# Patient Record
Sex: FEMALE | Race: WHITE | NOT HISPANIC OR LATINO
[De-identification: names, ages, dates, MRNs, and addresses within clinical notes are randomized per-mention and may not be internally consistent; named-entity substitution may affect disease eponyms.]

---

## 2019-09-17 ENCOUNTER — APPOINTMENT (OUTPATIENT)
Dept: ULTRASOUND IMAGING | Facility: HOSPITAL | Age: 84
End: 2019-09-17
Payer: MEDICARE

## 2019-09-17 ENCOUNTER — OUTPATIENT (OUTPATIENT)
Dept: OUTPATIENT SERVICES | Facility: HOSPITAL | Age: 84
LOS: 1 days | End: 2019-09-17
Payer: MEDICARE

## 2019-09-17 DIAGNOSIS — Z00.8 ENCOUNTER FOR OTHER GENERAL EXAMINATION: ICD-10-CM

## 2019-09-17 PROCEDURE — 93971 EXTREMITY STUDY: CPT | Mod: 26,LT

## 2019-09-17 PROCEDURE — 93971 EXTREMITY STUDY: CPT

## 2019-09-23 PROBLEM — Z00.00 ENCOUNTER FOR PREVENTIVE HEALTH EXAMINATION: Status: ACTIVE | Noted: 2019-09-23

## 2019-09-24 ENCOUNTER — APPOINTMENT (OUTPATIENT)
Dept: ORTHOPEDIC SURGERY | Facility: CLINIC | Age: 84
End: 2019-09-24
Payer: MEDICARE

## 2019-09-24 VITALS — BODY MASS INDEX: 19.41 KG/M2 | WEIGHT: 90 LBS | HEIGHT: 57 IN

## 2019-09-24 DIAGNOSIS — Z78.9 OTHER SPECIFIED HEALTH STATUS: ICD-10-CM

## 2019-09-24 DIAGNOSIS — M71.22 SYNOVIAL CYST OF POPLITEAL SPACE [BAKER], LEFT KNEE: ICD-10-CM

## 2019-09-24 PROCEDURE — 99202 OFFICE O/P NEW SF 15 MIN: CPT

## 2019-09-24 RX ORDER — SIMVASTATIN 80 MG/1
TABLET, FILM COATED ORAL
Refills: 0 | Status: ACTIVE | COMMUNITY

## 2019-09-24 RX ORDER — METOPROLOL TARTRATE 75 MG/1
TABLET, FILM COATED ORAL
Refills: 0 | Status: ACTIVE | COMMUNITY

## 2019-09-24 NOTE — HISTORY OF PRESENT ILLNESS
[de-identified] : Patient is a 85 year old female who presents for an initial evaluation regarding left knee swelling. She denies injury or direct trauma. She noticed the swelling several weeks ago. It is over the posterior aspect of the left knee. She went to a med station for an initial evaluation and was prescribed Ibuprofen by the attending physician. The medication did not help. She returned and was then referred for a Doppler US. The ultrasound was performed on 09/17/2019 and revealed a 4.7 x 1.7 x 3.8 cm left Baker's cyst. She followed up again at the med station for review at which point she was given Prednisone. She states that she does not like to take medication. She is not having much pain, but describes her feeling as mostly discomfort. The cyst is affecting her gait and she now has a slight limp on the left side. The swelling has traveled down and is now in the lower leg, ankle and foot.

## 2019-09-24 NOTE — END OF VISIT
[FreeTextEntry3] : I, Bill Thomson MD, ordering physician, have read and attest that all the information, medical decision making and discharge instructions within are true and accurate.

## 2019-09-24 NOTE — ADDENDUM
[FreeTextEntry1] : I, Vida Ochoa wrote this note acting as a scribe for Dr. Bill Thomson on Sep 24, 2019.

## 2019-09-24 NOTE — PHYSICAL EXAM
[de-identified] : Patient is WDWN, alert, and in no acute distress. Breathing is unlabored. She is grossly oriented to person, place, and time. \par \par Left knee: Mass present over the popliteal fossa. There is minimal tenderness to palpation over the posterior aspect. There is diffuse swelling throughout the remainder of the lower leg, ankle and foot. No valgus or valgus instability present on provocative testing. Flexion and extension 5/5.\par Range of motion: Active flexion and extension full and painless. No crepitus.\par Tests and Signs: All tests for stability are normal. \par Strength: flexion and extension 5/5  [de-identified] : Doppler US of the left lower extremity on 09/17/2019 at a [facility] revealed a 4.7 x 1.7 x 3.8 cm left Baker's cyst.

## 2019-09-24 NOTE — DISCUSSION/SUMMARY
[de-identified] : The underlying pathophysiology was reviewed with the patient. Treatment options were discussed including; observation, NSAIDS, analgesics, bracing, physical therapy. \par \par The patient was advised to discontinue the Prednisone. \par I also informed the patient the left knee Baker's cyst would eventually resorb. \par Follow up in 6 weeks.

## 2021-12-23 ENCOUNTER — TRANSCRIPTION ENCOUNTER (OUTPATIENT)
Age: 86
End: 2021-12-23

## 2023-01-01 ENCOUNTER — NON-APPOINTMENT (OUTPATIENT)
Age: 88
End: 2023-01-01

## 2023-07-13 ENCOUNTER — EMERGENCY (EMERGENCY)
Facility: HOSPITAL | Age: 88
LOS: 1 days | Discharge: HOME | End: 2023-07-13
Attending: INTERNAL MEDICINE | Admitting: STUDENT IN AN ORGANIZED HEALTH CARE EDUCATION/TRAINING PROGRAM
Payer: MEDICARE

## 2023-07-13 VITALS
TEMPERATURE: 98 F | WEIGHT: 85.1 LBS | RESPIRATION RATE: 20 BRPM | HEART RATE: 107 BPM | HEIGHT: 55 IN | OXYGEN SATURATION: 99 %

## 2023-07-13 VITALS
OXYGEN SATURATION: 98 % | TEMPERATURE: 99 F | RESPIRATION RATE: 20 BRPM | DIASTOLIC BLOOD PRESSURE: 89 MMHG | HEART RATE: 98 BPM | SYSTOLIC BLOOD PRESSURE: 144 MMHG

## 2023-07-13 PROCEDURE — 90471 IMMUNIZATION ADMIN: CPT

## 2023-07-13 PROCEDURE — 70450 CT HEAD/BRAIN W/O DYE: CPT | Mod: MA

## 2023-07-13 PROCEDURE — 70450 CT HEAD/BRAIN W/O DYE: CPT | Mod: 26,MA

## 2023-07-13 PROCEDURE — 90715 TDAP VACCINE 7 YRS/> IM: CPT

## 2023-07-13 PROCEDURE — 99284 EMERGENCY DEPT VISIT MOD MDM: CPT

## 2023-07-13 PROCEDURE — 99284 EMERGENCY DEPT VISIT MOD MDM: CPT | Mod: 25

## 2023-07-13 RX ORDER — TETANUS TOXOID, REDUCED DIPHTHERIA TOXOID AND ACELLULAR PERTUSSIS VACCINE, ADSORBED 5; 2.5; 8; 8; 2.5 [IU]/.5ML; [IU]/.5ML; UG/.5ML; UG/.5ML; UG/.5ML
0.5 SUSPENSION INTRAMUSCULAR ONCE
Refills: 0 | Status: COMPLETED | OUTPATIENT
Start: 2023-07-13 | End: 2023-07-13

## 2023-07-13 RX ADMIN — TETANUS TOXOID, REDUCED DIPHTHERIA TOXOID AND ACELLULAR PERTUSSIS VACCINE, ADSORBED 0.5 MILLILITER(S): 5; 2.5; 8; 8; 2.5 SUSPENSION INTRAMUSCULAR at 20:09

## 2023-07-13 NOTE — ED PROVIDER NOTE - NSFOLLOWUPINSTRUCTIONS_ED_ALL_ED_FT
Follow up with your PMD within 1-2 days.  Rest, increase your fluids, advance your activity as tolerated.   Take all of your other medications as previously prescribed.   Worsening, continued or ANY new concerning symptoms return to the emergency department.  Wound check in 2 days staple removal in 7 days  Keep the wound clean and dry for 24 hours bacitracin or Neosporin twice a day return to the emergency room for worsening pain swelling redness or any discharge

## 2023-07-13 NOTE — ED ADULT NURSE NOTE - PRIMARY CARE PROVIDER
Labs are within acceptable range.  Please mail Anuj Valadez a copy of her lab results.  Yeni Cantrell MD on 11/5/2020 at 3:57 PM  
unknown

## 2023-07-13 NOTE — ED PROVIDER NOTE - PHYSICAL EXAMINATION
General:     NAD, well-nourished, well-appearing  Head:     NC/ 2 cm laceration on the right lower occipital area superiorlyEOMI, oral mucosa moist  Neck:     trachea midline  Lungs:     CTA b/l, no w/r/r  CVS:     S1S2, RRR, no m/g/r  Abd:     +BS, s/nt/nd, no organomegaly  Ext:    2+ radial and pedal pulses, no c/c/e  Neuro: AAOx3, no sensory/motor deficits

## 2023-07-13 NOTE — ED PROVIDER NOTE - OBJECTIVE STATEMENT
89-year-old female with a past history of hypertension dyslipidemia brought to the emergency room with chief complaint of laceration on the scalp posteriorly patient hit the head on the radiator at home no loss of consciousness patient denies being on aspirin or any anticoagulation denies any headache nausea vomiting normal gait

## 2023-07-13 NOTE — ED ADULT NURSE NOTE - NSFALLHARMRISKINTERV_ED_ALL_ED

## 2023-07-13 NOTE — ED ADULT NURSE NOTE - NSFALLASSESSNEED_ED_ALL_ED
They were referred to [facility] hematology due to possible multiple myeloma concerns  They want referral/doctor's order to see our hem/onc doc's at [facility]  Please call her today  no

## 2023-07-13 NOTE — ED PROVIDER NOTE - CLINICAL SUMMARY MEDICAL DECISION MAKING FREE TEXT BOX
89-year-old female with a past history of hypertension dyslipidemia brought to the emergency room with chief complaint of laceration on the scalp posteriorly patient hit the head on the radiator at home no loss of consciousness patient denies being on aspirin or any anticoagulation denies any headache nausea vomiting normal gait  Scalp laceration 2 cm plan to repair laceration CT of the brain tetanus not up-to-date will give Tdap 89-year-old female with a past history of hypertension dyslipidemia brought to the emergency room with chief complaint of laceration on the scalp posteriorly patient hit the head on the radiator at home no loss of consciousness patient denies being on aspirin or any anticoagulation denies any headache nausea vomiting normal gait  Scalp laceration 2 cm plan to repair laceration CT of the brain tetanus not up-to-date will give Tdap  3 staples placed on the scalp

## 2023-07-15 ENCOUNTER — EMERGENCY (EMERGENCY)
Facility: HOSPITAL | Age: 88
LOS: 1 days | Discharge: HOME | End: 2023-07-15
Attending: INTERNAL MEDICINE | Admitting: INTERNAL MEDICINE
Payer: MEDICARE

## 2023-07-15 VITALS
HEART RATE: 63 BPM | HEIGHT: 55 IN | DIASTOLIC BLOOD PRESSURE: 67 MMHG | TEMPERATURE: 98 F | WEIGHT: 85.98 LBS | OXYGEN SATURATION: 96 % | RESPIRATION RATE: 17 BRPM | SYSTOLIC BLOOD PRESSURE: 144 MMHG

## 2023-07-15 PROCEDURE — G0463: CPT

## 2023-07-15 PROCEDURE — 99282 EMERGENCY DEPT VISIT SF MDM: CPT

## 2023-07-15 NOTE — ED PROVIDER NOTE - PATIENT PORTAL LINK FT
You can access the FollowMyHealth Patient Portal offered by [facility] by registering at the following website: [email or web address]. By joining [employer]’s FollowMyHealth portal, you will also be able to view your health information using other applications (apps) compatible with our system.

## 2023-07-15 NOTE — ED PROVIDER NOTE - CHIEF COMPLAINT
This document is complete and the patient is ready for discharge. The patient is a 89y Female complaining of wound check.

## 2023-07-15 NOTE — ED PROVIDER NOTE - OBJECTIVE STATEMENT
89-year-old female came for the wound check's had staples on the head still 2 days ago wound is healing fine looks clean dry and intact patient has been applying bacitracin

## 2023-07-15 NOTE — ED ADULT NURSE NOTE - OBJECTIVE STATEMENT
pt came to ED came for wound check had staples on top of head from 2 days ago. wound clean dry and intact. patient has been applying bacitracin. pt denies any fevers, sob, cp.

## 2023-07-15 NOTE — ED PROVIDER NOTE - NSFOLLOWUPINSTRUCTIONS_ED_ALL_ED_FT
Follow up with your PMD within 1-2 days.  Rest, increase your fluids, advance your activity as tolerated.   Take all of your other medications as previously prescribed.   Worsening, continued or ANY new concerning symptoms return to the emergency department.  Patient is recommended to return in 5 days for staple removal

## 2023-07-15 NOTE — ED PROVIDER NOTE - PHYSICAL EXAMINATION
General:     NAD, well-nourished, well-appearing  Head:     NC/Staples on the scalp the wound looks clean dry and intact EOMI, oral mucosa moist  Neck:     trachea midline  Lungs:     CTA b/l, no w/r/r  CVS:     S1S2, RRR, no m/g/r  Abd:     +BS, s/nt/nd, no organomegaly  Ext:    2+ radial and pedal pulses, no c/c/e  Neuro: AAOx3, no sensory/motor deficits

## 2023-07-15 NOTE — ED ADULT NURSE NOTE - NSFALLUNIVINTERV_ED_ALL_ED
Bed/Stretcher in lowest position, wheels locked, appropriate side rails in place/Call bell, personal items and telephone in reach/Instruct patient to call for assistance before getting out of bed/chair/stretcher/Non-slip footwear applied when patient is off stretcher/[place] to call system/Physically safe environment - no spills, clutter or unnecessary equipment/Purposeful proactive rounding/Room/bathroom lighting operational, light cord in reach

## 2023-07-16 NOTE — CHART NOTE - NSCHARTNOTEFT_GEN_A_CORE
SW called pt to discuss and assist with follow up care.  Pt is an 89 y/o female presented to ED for head injury.  Pt did not respond to the call , left VM with call back # if further assistance is needed.

## 2023-07-21 ENCOUNTER — EMERGENCY (EMERGENCY)
Facility: HOSPITAL | Age: 88
LOS: 1 days | Discharge: HOME | End: 2023-07-21
Attending: INTERNAL MEDICINE | Admitting: STUDENT IN AN ORGANIZED HEALTH CARE EDUCATION/TRAINING PROGRAM
Payer: MEDICARE

## 2023-07-21 VITALS — DIASTOLIC BLOOD PRESSURE: 68 MMHG | SYSTOLIC BLOOD PRESSURE: 105 MMHG

## 2023-07-21 VITALS
DIASTOLIC BLOOD PRESSURE: 66 MMHG | RESPIRATION RATE: 17 BRPM | WEIGHT: 85.98 LBS | HEIGHT: 55 IN | HEART RATE: 71 BPM | OXYGEN SATURATION: 98 % | TEMPERATURE: 98 F | SYSTOLIC BLOOD PRESSURE: 99 MMHG

## 2023-07-21 PROCEDURE — L9995: CPT

## 2023-07-21 PROCEDURE — G0463: CPT

## 2023-07-21 NOTE — ED PROVIDER NOTE - OBJECTIVE STATEMENT
89-year-old female came for the staple removal on the head and states wound is healing fine looks clean dry and intact patient has been applying bacitracin. Pt denies any HA, N/V, or any other acute complaints.

## 2023-07-21 NOTE — ED PROVIDER NOTE - PATIENT PORTAL LINK FT
You can access the FollowMyHealth Patient Portal offered by [facility] by registering at the following website: [email or web address]. By joining [employer]’s FollowMyHealth portal, you will also be able to view your health information using other applications (apps) compatible with our system.

## 2023-07-21 NOTE — ED PROCEDURE NOTE - PROCEDURE ADDITIONAL DETAILS
3 staples removed at the top of pt's head. Pt tolerated staple removal well, no signs of bleeding. Wound site is clean and dry with no signs of dehiscence, swelling, erythema, or discharge.

## 2023-07-21 NOTE — ED PROCEDURE NOTE - NS ED ATTENDING STATEMENT MOD
I have seen and examined this patient and fully participated in the care of this patient as the teaching attending.  The service was shared with the FIORELLA.  I reviewed and verified the documentation and independently performed the documented:

## 2023-07-21 NOTE — ED PROVIDER NOTE - PHYSICAL EXAMINATION
General:     NAD, well-nourished, well-appearing  Head:     NC/ sutured scalp wound C/D/I, EOMI, oral mucosa moist  Neck:     trachea midline  Lungs:     CTA b/l, no w/r/r  CVS:     S1S2, RRR, no m/g/r  Abd:     +BS, s/nt/nd, no organomegaly  Ext:    2+ radial and pedal pulses, no c/c/e  Neuro: AAOx3, no sensory/motor deficits

## 2023-07-21 NOTE — ED ADULT NURSE NOTE - NSFALLUNIVINTERV_ED_ALL_ED
Bed/Stretcher in lowest position, wheels locked, appropriate side rails in place/Call bell, personal items and telephone in reach/Instruct patient to call for assistance before getting out of bed/chair/stretcher/Non-slip footwear applied when patient is off stretcher/[place] to call system/Physically safe environment - no spills, clutter or unnecessary equipment/Purposeful proactive rounding/Room/bathroom lighting operational, light cord in reach

## 2023-07-21 NOTE — ED ADULT NURSE NOTE - OBJECTIVE STATEMENT
pt comes from home for staple removal. 3 staples placed on 7/13 on posterior scalp. pt denies any pain, dizziness, lightheadedness, fevers, chills, cp, n/v/d, HA.

## 2024-03-30 ENCOUNTER — EMERGENCY (EMERGENCY)
Facility: HOSPITAL | Age: 89
LOS: 1 days | Discharge: HOME | End: 2024-03-30
Attending: STUDENT IN AN ORGANIZED HEALTH CARE EDUCATION/TRAINING PROGRAM | Admitting: STUDENT IN AN ORGANIZED HEALTH CARE EDUCATION/TRAINING PROGRAM
Payer: MEDICARE

## 2024-03-30 VITALS
RESPIRATION RATE: 22 BRPM | HEIGHT: 57 IN | TEMPERATURE: 98 F | WEIGHT: 82.01 LBS | HEART RATE: 117 BPM | OXYGEN SATURATION: 97 %

## 2024-03-30 PROCEDURE — 99283 EMERGENCY DEPT VISIT LOW MDM: CPT

## 2024-03-30 RX ORDER — TRANEXAMIC ACID 100 MG/ML
5 INJECTION, SOLUTION INTRAVENOUS ONCE
Refills: 0 | Status: COMPLETED | OUTPATIENT
Start: 2024-03-30 | End: 2024-03-30

## 2024-03-30 RX ADMIN — TRANEXAMIC ACID 5 MILLILITER(S): 100 INJECTION, SOLUTION INTRAVENOUS at 13:30

## 2024-03-30 NOTE — ED ADULT NURSE NOTE - OBJECTIVE STATEMENT
Patient presents to ED complaining of atraumatic epistasis. Patient denies blood thinners, denies chest pain, denies SOB, denies headache, denies dizziness. Patient adamantly refusing blood pressure reading.

## 2024-03-30 NOTE — ED ADULT TRIAGE NOTE - CHIEF COMPLAINT QUOTE
Patient c/o atraumatic epistasis. Patient denies anticoagulation use, chest pain, SOB, headache, dizziness and blurry vision. Patient refusing bp in triage.

## 2024-03-30 NOTE — ED PROVIDER NOTE - PHYSICAL EXAMINATION
Vital signs reviewed  GENERAL: Patient nontoxic appearing, NAD  HEAD: NCAT  EYES: Anicteric  ENT: small oozing blood from L nostril. +clot formation. no pulsatile bleed. no posterior pharynx bleeding  NECK: Supple, non tender  RESPIRATORY: Normal respiratory effort. CTA B/L. No wheezing, rales, rhonchi  CARDIOVASCULAR: Regular rate and rhythm  ABDOMEN: Soft. Nondistended. Nontender. No guarding or rebound. No CVA tenderness.  MUSCULOSKELETAL/EXTREMITIES: Brisk cap refill. 2+ radial pulses. No leg edema.  SKIN:  Warm and dry  NEURO: AAOx3. No gross FND.  PSYCHIATRIC: Cooperative. Affect appropriate.

## 2024-03-30 NOTE — ED PROVIDER NOTE - OBJECTIVE STATEMENT
89y F not on ac p/w epistaxis x 1hr PTA  states she was just sitting when she began having bleeding from L nostril. unable to control with pressure. has hx of nose surgery years ago, unable to explain for what. 89y F not on ac p/w epistaxis x 1hr PTA  states she was just sitting when she began having bleeding from L nostril. unable to control with pressure. has hx of nose surgery years ago, unable to explain the reason for surgery. otherwise no other complaints

## 2024-03-30 NOTE — ED ADULT NURSE NOTE - NS ED NURSE DISCH DISPOSITION
----- Message from Tricia Givens sent at 9/14/2020  1:02 PM CDT -----  Type:  Patient Returning Call    Who Called: pt   Who Left Message for Patient: pt   Does the patient know what this is regarding?: pt need a call she is late for her appt   Would the patient rather a call back or a response via [facility]?  Call   Best Call Back Number:[phone]  Additional Information:  pt need a call back         Discharged

## 2024-03-30 NOTE — ED PROVIDER NOTE - NSFOLLOWUPINSTRUCTIONS_ED_ALL_ED_FT
FOLLOW THESE TIPS AFTER A NOSEBLEED, TO MINIMIZE FURTHER  PROBLEMS:  • Avoid hard nose blowing, straining, and nose picking.    • Sneeze through an open mouth.    • Do not take aspirin or products containing aspirin for 2 weeks following a  nosebleed.    • Use Afrin or a similar long-acting nasal spray twice a day for three days  following a nosebleed. Vaseline or a similar non-medicated ointment may be  applied to the inside of your nose using the tip of a finger, being careful of  sharp fingernails.    • For mild nosebleeds, spray Afrin or a similar nose spray onto a wisp of cotton  and apply to the inside of the nose using mild pressure. For more severe  nosebleeds, return to our office or go to the emergency room.    • Keep the nose moist with saline (salt water) spray as needed. You can find it  at your pharmacy (brands include Humist, [place], NaSal, and Ocean). If it doesn’t  keep the nose moist enough, try an over-the-counter saline gel; apply it to  the inside of the nose, but do not use a cotton swab.    • The ideal humidity is 40-50%. Low humidity is usually a problem in cool or  cold weather when heating is used. Humidity gauges and humidifiers are  widely available at local discount stores or pharmacies. The ultrasonic types  of humidifiers tend to work better. Humidifiers must be kept absolutely clean,  according to the ’s instructions, and boiled or distilled water  should be used, if possible. Consider placing the humidifier near a heating  system intake vent to moisturize the entire house.    • If your blood pressure is up or if you bruise easily or bleed from other sites,  speak with your family physician or internist as soon as possible.

## 2024-03-30 NOTE — ED PROVIDER NOTE - CLINICAL SUMMARY MEDICAL DECISION MAKING FREE TEXT BOX
This  patient not on AC presents with active epistaxis requiring intervention. The patient is hemodynamically stable without evidence of symptomatic anemia. Will treat with direct pressure, oxymetazoline PRN, and possibly placement of packing with TXA or a rhino-rocket if not able to control bleeding. Will also offer patient the option of silver nitrate cautery if a bleeding vessel is identified.  Plan: direct pressure, oxymetazoline IN, consider TXA, packing, rhino-rocket, +/- ENT consult This  patient not on AC presents with active epistaxis requiring intervention. The patient is hemodynamically stable without evidence of symptomatic anemia. Will treat with direct pressure, oxymetazoline PRN, and possibly placement of packing with TXA or a rhino-rocket if not able to control bleeding. Will also offer patient the option of silver nitrate cautery if a bleeding vessel is identified.  Plan: direct pressure, oxymetazoline IN, consider TXA, packing, rhino-rocket, +/- ENT consult    Pt well appearing, HDS. Can be discharged because nose bleed has stopped, watched in ED no further bleeding episodes  Patient understands and agrees with the plan of discharge and to follow up with ENT, pt has ENT she sees  Return precautions discussed.  Patient verbalized full understanding.   Patient comfortable going home.  Strict return precautions to the ER for any worsening bleeding to come back to ed

## 2024-03-30 NOTE — ED ADULT NURSE NOTE - NSFALLUNIVINTERV_ED_ALL_ED
Bed/Stretcher in lowest position, wheels locked, appropriate side rails in place/Call bell, personal items and telephone in reach/Instruct patient to call for assistance before getting out of bed/chair/stretcher/Non-slip footwear applied when patient is off stretcher/[place] to call system/Physically safe environment - no spills, clutter or unnecessary equipment/Purposeful proactive rounding/Room/bathroom lighting operational, light cord in reach

## 2024-03-31 ENCOUNTER — EMERGENCY (EMERGENCY)
Facility: HOSPITAL | Age: 89
LOS: 1 days | Discharge: HOME | End: 2024-03-31
Attending: STUDENT IN AN ORGANIZED HEALTH CARE EDUCATION/TRAINING PROGRAM | Admitting: STUDENT IN AN ORGANIZED HEALTH CARE EDUCATION/TRAINING PROGRAM
Payer: MEDICARE

## 2024-03-31 VITALS
DIASTOLIC BLOOD PRESSURE: 86 MMHG | SYSTOLIC BLOOD PRESSURE: 138 MMHG | HEART RATE: 71 BPM | RESPIRATION RATE: 16 BRPM | OXYGEN SATURATION: 98 % | TEMPERATURE: 97 F | WEIGHT: 84 LBS | HEIGHT: 57 IN

## 2024-03-31 PROCEDURE — 99283 EMERGENCY DEPT VISIT LOW MDM: CPT

## 2024-03-31 PROCEDURE — G0463: CPT

## 2024-03-31 RX ORDER — SODIUM CHLORIDE 0.65 %
1 AEROSOL, SPRAY (ML) NASAL ONCE
Refills: 0 | Status: COMPLETED | OUTPATIENT
Start: 2024-03-31 | End: 2024-03-31

## 2024-03-31 RX ORDER — OXYMETAZOLINE HYDROCHLORIDE 0.5 MG/ML
1 SPRAY NASAL ONCE
Refills: 0 | Status: COMPLETED | OUTPATIENT
Start: 2024-03-31 | End: 2024-03-31

## 2024-03-31 RX ADMIN — Medication 1 SPRAY(S): at 09:02

## 2024-03-31 RX ADMIN — OXYMETAZOLINE HYDROCHLORIDE 1 SPRAY(S): 0.5 SPRAY NASAL at 09:01

## 2024-03-31 NOTE — ED PROVIDER NOTE - NSFOLLOWUPINSTRUCTIONS_ED_ALL_ED_FT
Nosebleed, Adult  A nosebleed is when blood comes out of the nose. Nosebleeds are common. Usually, they are not a sign of a serious condition.    Nosebleeds can happen if a blood vessel in your nose starts to bleed or if the lining of your nose (mucous membrane) cracks. They are commonly caused by:  Allergies.  Colds.  Picking your nose.  Blowing your nose too hard.  An injury from sticking an object into your nose or getting hit in the nose.  Dry or cold air.  Less common causes of nosebleeds include:  Toxic fumes.  Something abnormal in the nose or in the air-filled spaces in the bones of the face (sinuses).  Growths in the nose, such as polyps.  Blood thinners or conditions that cause blood to clot slowly.  Certain illnesses or procedures that irritate or dry out the nasal passages.  Follow these instructions at home:  When you have a nosebleed:      Sit down and tilt your head slightly forward.  Use a clean towel or tissue to pinch your nostrils under the bony part of your nose. After 5 minutes, let go of your nose and see if bleeding starts again. Do not release pressure before that time. If there is still bleeding, repeat the pinching and holding for 5 minutes or until the bleeding stops.  Do not place tissues or gauze in the nose to stop the bleeding.  Avoid lying down and avoid tilting your head backward. That may make blood collect in the throat and cause gagging or coughing.  Use a nasal spray decongestant to help with a nosebleed as told by your health care provider.  After a nosebleed:    Avoid blowing your nose or sniffing for a number of hours.  Avoid straining, lifting, or bending at the waist for several days. You may go back to other normal activities as you are able.  If you are taking aspirin or blood thinners and you have nosebleeds, talk to your health care provider. These medicines make bleeding more likely.  Ask your health care provider if you should stop taking the medicines or if you should adjust the dose.  Do not stop taking medicines that your health care provider has recommended unless he or she tells you to stop taking them.  If your nosebleed was caused by dry mucous membranes, use over-the-counter saline nasal spray or gel and a humidifier as told by your health care provider. This will keep the mucous membranes moist and allow them to heal. If you need to use one of these products:  Choose one that is water-soluble.  Use only as much as you need and use it only as often as needed.  Do not lie down right after you use it.  If you get nosebleeds often, talk with your health care provider about medical treatments. Options may include:  Nasal cautery. This treatment stops and prevents nosebleeds by using a chemical swab or electrical device to lightly burn tiny blood vessels inside the nose.  Nasal packing. A gauze or other material is placed in the nose to keep constant pressure on the bleeding area.  Contact a health care provider if you:  Have a fever.  Get nosebleeds often or more often than usual.  Bruise very easily.  Have a nosebleed from having something stuck in your nose.  Have bleeding in your mouth.  Vomit or cough up brown material.  Have a nosebleed after you start a new medicine.  Get help right away if:  You have a nosebleed after a fall or a head injury.  Your nosebleed does not go away after 20 minutes.  You feel dizzy or weak.  You have unusual bleeding from other parts of your body.  You have unusual bruising on other parts of your body.  You become sweaty.  You vomit blood.  Summary  A nosebleed is when blood comes out of the nose. Common causes include allergies, an injury to the nose, or cold or dry air.  Initial treatment includes applying pressure for 5 minutes.  Moisturizing the nose with saline nasal spray or gel after a nosebleed may help prevent future bleeding.  Get help right away if your nosebleed does not go away after 20 minutes.

## 2024-03-31 NOTE — ED PROVIDER NOTE - CLINICAL SUMMARY MEDICAL DECISION MAKING FREE TEXT BOX
Pt presents to ER for removal of nasal packing after nose bleed yesterday  packing removed, patient feels well. no active bleeding. has ENT follow up    Pt well appearing, HDS. Can be discharged because no further bleeding epispdes  Patient understands and agrees with the plan of discharge and to follow up with ENT  Return precautions discussed.  Patient verbalized full understanding.   Patient comfortable going home.  Strict return precautions to the ER for any worsening bleeding to come back to ed   Explained incidental findings on imaging and labs, will have it followed up by PMD  pt watched in ER for 30mins, no further bleeding episodes.

## 2024-03-31 NOTE — ED PROVIDER NOTE - OBJECTIVE STATEMENT
89y F not on ac seen yesterday for epistaxis, here for removal of packing of L nostril. otherwise no other symptoms.

## 2024-03-31 NOTE — ED ADULT NURSE NOTE - NSFALLUNIVINTERV_ED_ALL_ED
Bed/Stretcher in lowest position, wheels locked, appropriate side rails in place/Call bell, personal items and telephone in reach/Instruct patient to call for assistance before getting out of bed/chair/stretcher/Non-slip footwear applied when patient is off stretcher/[place] to call system/Physically safe environment - no spills, clutter or unnecessary equipment/Purposeful proactive rounding/Room/bathroom lighting operational, light cord in reach

## 2024-03-31 NOTE — ED ADULT NURSE NOTE - OBJECTIVE STATEMENT
Assumed pt care for a 89 yr old female complaining of a nose bleed. Pt reports coming to the ED las night. Since then has not bleed. Denies any post nasal drip or cough. Denies any further complaints. Reports she is afraid to remove the guaze in her nose. MD at bedside to remove guaze. meds to be given as per order.

## 2024-03-31 NOTE — ED PROVIDER NOTE - PATIENT PORTAL LINK FT
You can access the FollowMyHealth Patient Portal offered by [facility] by registering at the following website: [email or web address]. By joining [employer]’s FollowMyHealth portal, you will also be able to view your health information using other applications (apps) compatible with our system.

## 2024-03-31 NOTE — ED PROVIDER NOTE - PHYSICAL EXAMINATION
Vital signs reviewed  GENERAL: Patient nontoxic appearing, NAD  HEAD: NCAT  EYES: Anicteric  ENT: Dry packing removed from L nostril, no active bleeding.   NECK: Supple, non tender  RESPIRATORY: Normal respiratory effort. CTA B/L. No wheezing, rales, rhonchi  CARDIOVASCULAR: Regular rate and rhythm  ABDOMEN: Soft. Nondistended. Nontender. No guarding or rebound. No CVA tenderness.  MUSCULOSKELETAL/EXTREMITIES: Brisk cap refill. 2+ radial pulses. No leg edema.  SKIN:  Warm and dry  NEURO: AAOx3. No gross FND.  PSYCHIATRIC: Cooperative. Affect appropriate.

## 2024-04-06 NOTE — CHART NOTE - NSCHARTNOTEFT_GEN_A_CORE
SW called pt to discuss and assist with follow up care.  Pt is 89 y/o female presented to ED for epistaxes.  Pt reports that pt is feeling ok now and has not scheduled follow up appt, declined SW assistance with it.

## 2024-05-29 ENCOUNTER — NON-APPOINTMENT (OUTPATIENT)
Age: 89
End: 2024-05-29

## 2024-05-30 ENCOUNTER — APPOINTMENT (OUTPATIENT)
Dept: NEUROLOGY | Facility: CLINIC | Age: 89
End: 2024-05-30
Payer: MEDICARE

## 2024-05-30 VITALS
WEIGHT: 82 LBS | BODY MASS INDEX: 18.44 KG/M2 | OXYGEN SATURATION: 93 % | TEMPERATURE: 98.4 F | HEIGHT: 56 IN | HEART RATE: 65 BPM

## 2024-05-30 VITALS
WEIGHT: 82 LBS | TEMPERATURE: 98.4 F | OXYGEN SATURATION: 93 % | BODY MASS INDEX: 18.44 KG/M2 | HEART RATE: 65 BPM | HEIGHT: 56 IN

## 2024-05-30 DIAGNOSIS — R43.8 OTHER DISTURBANCES OF SMELL AND TASTE: ICD-10-CM

## 2024-05-30 PROCEDURE — 99204 OFFICE O/P NEW MOD 45 MIN: CPT

## 2024-05-30 NOTE — REASON FOR VISIT
[Initial Evaluation] : an initial evaluation [FreeTextEntry1] : Bad taste in the mouth and bad breath

## 2024-05-30 NOTE — HISTORY OF PRESENT ILLNESS
[FreeTextEntry1] : This patient is seen for an office consultation.  She is a 89-year-old female who describes years of bad taste in her mouth.  She also describes bad breath.  She did see gastroenterology and she has been prescribed omeprazole 20 mg once daily which has been partially helpful.  She was also given a trial of nystatin which was of no benefit.  She has seen her dentist and a periodontist and she did have an ENT evaluation without diagnosis.  She denies headache dizziness facial weakness or numbness loss of taste and smell problems with speech or swallowing and any motor or sensory complaints.  There is a past history of hypertension hyperlipidemia for which she receives medication including simvastatin and metoprolol.

## 2024-05-30 NOTE — ASSESSMENT
[FreeTextEntry1] : Impression: This 89-year-old female with history of hypertension and hyperlipidemia has a longstanding history of a bad taste in the mouth.  She also complains of bad breath.  She has had negative dental ENT and GI evaluations.  There is some positive response to omeprazole.  Doubt underlying neurological etiology.  Today's neurological exam is unremarkable in this regard.  Recommendations: Defer further neurological workup.  Follow-up with dental/periodontist.  Agree with omeprazole.  Note that the patient has poison ivy affecting both upper extremities.  Blood pressure was not tested at the request of the patient.  I did suggest she present to urgent care for evaluation and treatment of her poison ivy.

## 2024-05-30 NOTE — PHYSICAL EXAM
[FreeTextEntry1] : Head:  Normocephalic Neck: Supple nontender no carotid bruits.    Mental Status:  Alert Oriented X3 Speech normal and no aphasia or dysarthria.  Cranial Nerves:  PERRL, Visual Fields full  EOMI no diplopia no ptosis no nystagmus, there is no facial weakness or sensory loss.  Tongue is in the midline with no atrophy or fasciculation.  Palate elevates symmetrically.  Motor:  No drift, normal strength tone and coordination and no focal atrophy. No abnormal movements. No dysmetria.  Normal rapid alternating movements.   DTRs: Symmetric.  Plantars flexor.  No Clonus.  Sensory:  Normal testing with pin light touch.  Gait: Regular.

## 2025-04-22 NOTE — ED ADULT NURSE NOTE - PRIMARY CARE PROVIDER
critical illness venous access arterial puncture to obtain ABG's/critical patient/monitoring purposes critical patient/monitoring purposes monitoring purposes blood sampling/critical patient/monitoring purposes MD teran